# Patient Record
Sex: MALE | Race: AMERICAN INDIAN OR ALASKA NATIVE | NOT HISPANIC OR LATINO | URBAN - METROPOLITAN AREA
[De-identification: names, ages, dates, MRNs, and addresses within clinical notes are randomized per-mention and may not be internally consistent; named-entity substitution may affect disease eponyms.]

---

## 2018-02-02 ENCOUNTER — INPATIENT (INPATIENT)
Facility: HOSPITAL | Age: 63
LOS: 0 days | Discharge: HOME | End: 2018-02-03
Attending: INTERNAL MEDICINE | Admitting: INTERNAL MEDICINE
Payer: MEDICAID

## 2018-02-02 VITALS
DIASTOLIC BLOOD PRESSURE: 87 MMHG | RESPIRATION RATE: 20 BRPM | HEART RATE: 80 BPM | SYSTOLIC BLOOD PRESSURE: 154 MMHG | OXYGEN SATURATION: 100 % | TEMPERATURE: 98 F

## 2018-02-02 DIAGNOSIS — I24.9 ACUTE ISCHEMIC HEART DISEASE, UNSPECIFIED: ICD-10-CM

## 2018-02-02 DIAGNOSIS — Z98.49 CATARACT EXTRACTION STATUS, UNSPECIFIED EYE: Chronic | ICD-10-CM

## 2018-02-02 DIAGNOSIS — I10 ESSENTIAL (PRIMARY) HYPERTENSION: ICD-10-CM

## 2018-02-02 LAB
ALBUMIN SERPL ELPH-MCNC: 4 G/DL — SIGNIFICANT CHANGE UP (ref 3.3–5)
ALP SERPL-CCNC: 81 U/L — SIGNIFICANT CHANGE UP (ref 40–120)
ALT FLD-CCNC: 15 U/L — SIGNIFICANT CHANGE UP (ref 4–41)
APTT BLD: 34.5 SEC — SIGNIFICANT CHANGE UP (ref 27.5–37.4)
AST SERPL-CCNC: 18 U/L — SIGNIFICANT CHANGE UP (ref 4–40)
BASOPHILS # BLD AUTO: 0.06 K/UL — SIGNIFICANT CHANGE UP (ref 0–0.2)
BASOPHILS NFR BLD AUTO: 0.9 % — SIGNIFICANT CHANGE UP (ref 0–2)
BILIRUB SERPL-MCNC: 0.9 MG/DL — SIGNIFICANT CHANGE UP (ref 0.2–1.2)
BUN SERPL-MCNC: 11 MG/DL — SIGNIFICANT CHANGE UP (ref 7–23)
CALCIUM SERPL-MCNC: 9.1 MG/DL — SIGNIFICANT CHANGE UP (ref 8.4–10.5)
CHLORIDE SERPL-SCNC: 103 MMOL/L — SIGNIFICANT CHANGE UP (ref 98–107)
CK MB BLD-MCNC: 2.08 NG/ML — SIGNIFICANT CHANGE UP (ref 1–6.6)
CK MB BLD-MCNC: 2.16 NG/ML — SIGNIFICANT CHANGE UP (ref 1–6.6)
CK SERPL-CCNC: 106 U/L — SIGNIFICANT CHANGE UP (ref 30–200)
CK SERPL-CCNC: 112 U/L — SIGNIFICANT CHANGE UP (ref 30–200)
CO2 SERPL-SCNC: 24 MMOL/L — SIGNIFICANT CHANGE UP (ref 22–31)
CREAT SERPL-MCNC: 1.01 MG/DL — SIGNIFICANT CHANGE UP (ref 0.5–1.3)
D DIMER BLD IA.RAPID-MCNC: < 150 NG/ML — SIGNIFICANT CHANGE UP
EOSINOPHIL # BLD AUTO: 0.41 K/UL — SIGNIFICANT CHANGE UP (ref 0–0.5)
EOSINOPHIL NFR BLD AUTO: 5.9 % — SIGNIFICANT CHANGE UP (ref 0–6)
GLUCOSE SERPL-MCNC: 127 MG/DL — HIGH (ref 70–99)
HCT VFR BLD CALC: 45.4 % — SIGNIFICANT CHANGE UP (ref 39–50)
HGB BLD-MCNC: 15.3 G/DL — SIGNIFICANT CHANGE UP (ref 13–17)
IMM GRANULOCYTES # BLD AUTO: 0.01 # — SIGNIFICANT CHANGE UP
IMM GRANULOCYTES NFR BLD AUTO: 0.1 % — SIGNIFICANT CHANGE UP (ref 0–1.5)
INR BLD: 1.19 — HIGH (ref 0.88–1.17)
LYMPHOCYTES # BLD AUTO: 1.95 K/UL — SIGNIFICANT CHANGE UP (ref 1–3.3)
LYMPHOCYTES # BLD AUTO: 28.1 % — SIGNIFICANT CHANGE UP (ref 13–44)
MCHC RBC-ENTMCNC: 28.9 PG — SIGNIFICANT CHANGE UP (ref 27–34)
MCHC RBC-ENTMCNC: 33.7 % — SIGNIFICANT CHANGE UP (ref 32–36)
MCV RBC AUTO: 85.7 FL — SIGNIFICANT CHANGE UP (ref 80–100)
MONOCYTES # BLD AUTO: 0.5 K/UL — SIGNIFICANT CHANGE UP (ref 0–0.9)
MONOCYTES NFR BLD AUTO: 7.2 % — SIGNIFICANT CHANGE UP (ref 2–14)
NEUTROPHILS # BLD AUTO: 4.02 K/UL — SIGNIFICANT CHANGE UP (ref 1.8–7.4)
NEUTROPHILS NFR BLD AUTO: 57.8 % — SIGNIFICANT CHANGE UP (ref 43–77)
NRBC # FLD: 0 — SIGNIFICANT CHANGE UP
PLATELET # BLD AUTO: 219 K/UL — SIGNIFICANT CHANGE UP (ref 150–400)
PMV BLD: 9.7 FL — SIGNIFICANT CHANGE UP (ref 7–13)
POTASSIUM SERPL-MCNC: 4.1 MMOL/L — SIGNIFICANT CHANGE UP (ref 3.5–5.3)
POTASSIUM SERPL-SCNC: 4.1 MMOL/L — SIGNIFICANT CHANGE UP (ref 3.5–5.3)
PROT SERPL-MCNC: 7.6 G/DL — SIGNIFICANT CHANGE UP (ref 6–8.3)
PROTHROM AB SERPL-ACNC: 13.3 SEC — HIGH (ref 9.8–13.1)
RBC # BLD: 5.3 M/UL — SIGNIFICANT CHANGE UP (ref 4.2–5.8)
RBC # FLD: 12.7 % — SIGNIFICANT CHANGE UP (ref 10.3–14.5)
SODIUM SERPL-SCNC: 140 MMOL/L — SIGNIFICANT CHANGE UP (ref 135–145)
TROPONIN T SERPL-MCNC: < 0.06 NG/ML — SIGNIFICANT CHANGE UP (ref 0–0.06)
TROPONIN T SERPL-MCNC: < 0.06 NG/ML — SIGNIFICANT CHANGE UP (ref 0–0.06)
TSH SERPL-MCNC: 2.18 UIU/ML — SIGNIFICANT CHANGE UP (ref 0.27–4.2)
WBC # BLD: 6.95 K/UL — SIGNIFICANT CHANGE UP (ref 3.8–10.5)
WBC # FLD AUTO: 6.95 K/UL — SIGNIFICANT CHANGE UP (ref 3.8–10.5)

## 2018-02-02 PROCEDURE — 71046 X-RAY EXAM CHEST 2 VIEWS: CPT | Mod: 26

## 2018-02-02 RX ORDER — ASPIRIN/CALCIUM CARB/MAGNESIUM 324 MG
162 TABLET ORAL ONCE
Qty: 0 | Refills: 0 | Status: COMPLETED | OUTPATIENT
Start: 2018-02-02 | End: 2018-02-02

## 2018-02-02 RX ORDER — HYDROCHLOROTHIAZIDE 25 MG
12.5 TABLET ORAL DAILY
Qty: 0 | Refills: 0 | Status: DISCONTINUED | OUTPATIENT
Start: 2018-02-02 | End: 2018-02-03

## 2018-02-02 RX ORDER — IBUPROFEN 200 MG
600 TABLET ORAL EVERY 8 HOURS
Qty: 0 | Refills: 0 | Status: DISCONTINUED | OUTPATIENT
Start: 2018-02-02 | End: 2018-02-03

## 2018-02-02 RX ORDER — SODIUM CHLORIDE 9 MG/ML
1000 INJECTION INTRAMUSCULAR; INTRAVENOUS; SUBCUTANEOUS ONCE
Qty: 0 | Refills: 0 | Status: COMPLETED | OUTPATIENT
Start: 2018-02-02 | End: 2018-02-02

## 2018-02-02 RX ORDER — LOSARTAN/HYDROCHLOROTHIAZIDE 100MG-25MG
1 TABLET ORAL
Qty: 0 | Refills: 0 | COMMUNITY

## 2018-02-02 RX ORDER — ASPIRIN/CALCIUM CARB/MAGNESIUM 324 MG
81 TABLET ORAL DAILY
Qty: 0 | Refills: 0 | Status: DISCONTINUED | OUTPATIENT
Start: 2018-02-03 | End: 2018-02-03

## 2018-02-02 RX ORDER — LOSARTAN POTASSIUM 100 MG/1
50 TABLET, FILM COATED ORAL DAILY
Qty: 0 | Refills: 0 | Status: DISCONTINUED | OUTPATIENT
Start: 2018-02-02 | End: 2018-02-03

## 2018-02-02 RX ORDER — INFLUENZA VIRUS VACCINE 15; 15; 15; 15 UG/.5ML; UG/.5ML; UG/.5ML; UG/.5ML
0.5 SUSPENSION INTRAMUSCULAR ONCE
Qty: 0 | Refills: 0 | Status: DISCONTINUED | OUTPATIENT
Start: 2018-02-02 | End: 2018-02-03

## 2018-02-02 RX ORDER — ENOXAPARIN SODIUM 100 MG/ML
40 INJECTION SUBCUTANEOUS EVERY 24 HOURS
Qty: 0 | Refills: 0 | Status: DISCONTINUED | OUTPATIENT
Start: 2018-02-02 | End: 2018-02-03

## 2018-02-02 RX ADMIN — Medication 12.5 MILLIGRAM(S): at 21:47

## 2018-02-02 RX ADMIN — SODIUM CHLORIDE 500 MILLILITER(S): 9 INJECTION INTRAMUSCULAR; INTRAVENOUS; SUBCUTANEOUS at 12:51

## 2018-02-02 RX ADMIN — Medication 162 MILLIGRAM(S): at 12:04

## 2018-02-02 RX ADMIN — ENOXAPARIN SODIUM 40 MILLIGRAM(S): 100 INJECTION SUBCUTANEOUS at 21:47

## 2018-02-02 RX ADMIN — LOSARTAN POTASSIUM 50 MILLIGRAM(S): 100 TABLET, FILM COATED ORAL at 21:47

## 2018-02-02 NOTE — H&P ADULT - NEGATIVE NEUROLOGICAL SYMPTOMS
no generalized seizures/no headache/no syncope/no loss of consciousness/no focal seizures/no vertigo

## 2018-02-02 NOTE — ED PROVIDER NOTE - ATTENDING CONTRIBUTION TO CARE
62M h/o HTN presents from home with family for chest pressure to left side. Started yesterday, gradual, nonradiating, no aggravating/alleviating factors. Some SOB, cough, nasal congestion.    Patient denies headache, vision changes, focal weakness/numbness, neck pain, fever, back pain, abd pain, nausea, vomiting, diarrhea, constipation, blood in stool, dysuria, rash, trauma, falls. Patient is well appearing, conversant, cooperative, smiling, head atraumatic, neck supple with full range of motion, oropharynx clear, lungs clear, no crackles or rales, speaking full sentences, heart clear, no murmurs, distal pulses equal in all 4 extremities, abdomen soft nontender nondistended with no masses, no leg edema, no calf tenderness, nonfocal neurologic exam.    The patient's risk factors for ACS were reviewed as well as the EKG.  The CXR assists in r/o Pneumonia, Pneumothorax, Esophageal Tears.  The patient does not appear to have a Pulmonary Embolism based on the Wells Score and there is no apparent DVT.  There are no signs of Pericarditis, Endocarditis, or Myocarditis based on risk factor analysis.  There is no fever.  There does not appear to be an Aortic Dissection either based on history, physical exam, and signs. Serial ekg, troponin, cardiac monitor, aspirin, reassess.

## 2018-02-02 NOTE — ED PROVIDER NOTE - PROGRESS NOTE DETAILS
trop appreciated. will admit for further workup. discussed with dr. hernandez - will admit to his service for further workup

## 2018-02-02 NOTE — H&P ADULT - NEGATIVE ENMT SYMPTOMS
no nasal congestion/no ear pain/no vertigo/no sinus symptoms/no throat pain/no hearing difficulty/no tinnitus/no nasal discharge/no dysphagia

## 2018-02-02 NOTE — ED PROVIDER NOTE - OBJECTIVE STATEMENT
61 yo male w/ pmhx of HTN c/o chest pain. per the patient he has been having chest pain since yesterday. pain is pressure or sharp at times. Not associated w/ position or exertion but persistently is there. Has been having cough, nonproductive over the past 3-4 days as well and states that people at home have flu like symptoms. Denies any n/v/d, abdominal pain, new back pain, or dizziness. Denies any sob but does state that when he takes a deep breath the pain gets a little worse. No recent falls or trauma to that side. No hx of stress test or cardiac workup but does that his physician has told him "everything is ok." Does not take a baby asa daily.

## 2018-02-02 NOTE — H&P ADULT - HISTORY OF PRESENT ILLNESS
61 yo M p/w constant left sided CP described as "hurting", 4/10 severity, non-radiating since mid-day yesterday. Pt ignored CP initially but later in the day noticed it worsened to 7/10 severity saw with inhalation. Pt did not notice any exertional, reproducible, or positional component to his CP. Denies experiencing this type of CP in the past.  Pt did feel 1 episode of SOB while washing his face briefly. No other SOB, CONCEPCION, or LE edema. Pt works as an [employer] , denies lifting anything too heavy yesterday.  Pt did have a dry cough that lasted 2-3 days about 2 weeks ago but that has resolved.

## 2018-02-02 NOTE — ED PROVIDER NOTE - MEDICAL DECISION MAKING DETAILS
61 yo male w/ pmhx of HTN c/o chest pain, left sided. Given history and presentation consider PNA vs bronchitis vs ACS. Heart score 2-3. Will send labs, cxr. 61 yo male w/ pmhx of HTN c/o chest pain, left sided. Given history and presentation consider PNA vs bronchitis vs ACS. Heart score 2-3. Will send labs, cxr.  EKG NSR at 77

## 2018-02-02 NOTE — H&P ADULT - PROBLEM SELECTOR PLAN 1
Admit to Telemetry, serial CE's, EKG's, FLP, Ddimer, start ASA, F/U Cardiology note Admit to Telemetry, serial CE's, EKG's, FLP, Ddimer, Echo, start ASA, F/U Cardiology note

## 2018-02-02 NOTE — ED ADULT NURSE NOTE - OBJECTIVE STATEMENT
Pt A&Ox4, primarily Christianity speaking with daughter at bedside for translation if needed. c/o left-sided intermittent cp starting yesterday. Denies any dizziness, sob, n/v/d. pmhx HTN. #20g IV placed in right AC, labs done. MD at bedside for eval. VS done as charted, breathing well on RA. NSR on cardiac monitor. Will continue to monitor.

## 2018-02-02 NOTE — H&P ADULT - ASSESSMENT
61 yo M p/w left sided CP since yesterday 63 yo M p/w left sided CP since yesterday  EKG- NSR @ 77 TWI avL

## 2018-02-03 VITALS
TEMPERATURE: 98 F | RESPIRATION RATE: 18 BRPM | SYSTOLIC BLOOD PRESSURE: 109 MMHG | HEART RATE: 68 BPM | DIASTOLIC BLOOD PRESSURE: 78 MMHG | OXYGEN SATURATION: 97 %

## 2018-02-03 LAB
ALBUMIN SERPL ELPH-MCNC: 4 G/DL — SIGNIFICANT CHANGE UP (ref 3.3–5)
ALP SERPL-CCNC: 77 U/L — SIGNIFICANT CHANGE UP (ref 40–120)
ALT FLD-CCNC: 17 U/L — SIGNIFICANT CHANGE UP (ref 4–41)
AST SERPL-CCNC: 17 U/L — SIGNIFICANT CHANGE UP (ref 4–40)
BASOPHILS # BLD AUTO: 0.07 K/UL — SIGNIFICANT CHANGE UP (ref 0–0.2)
BASOPHILS NFR BLD AUTO: 0.9 % — SIGNIFICANT CHANGE UP (ref 0–2)
BILIRUB SERPL-MCNC: 1 MG/DL — SIGNIFICANT CHANGE UP (ref 0.2–1.2)
BUN SERPL-MCNC: 11 MG/DL — SIGNIFICANT CHANGE UP (ref 7–23)
CALCIUM SERPL-MCNC: 9.2 MG/DL — SIGNIFICANT CHANGE UP (ref 8.4–10.5)
CHLORIDE SERPL-SCNC: 101 MMOL/L — SIGNIFICANT CHANGE UP (ref 98–107)
CHOLEST SERPL-MCNC: 170 MG/DL — SIGNIFICANT CHANGE UP (ref 120–199)
CO2 SERPL-SCNC: 24 MMOL/L — SIGNIFICANT CHANGE UP (ref 22–31)
CREAT SERPL-MCNC: 1.03 MG/DL — SIGNIFICANT CHANGE UP (ref 0.5–1.3)
EOSINOPHIL # BLD AUTO: 0.45 K/UL — SIGNIFICANT CHANGE UP (ref 0–0.5)
EOSINOPHIL NFR BLD AUTO: 6 % — SIGNIFICANT CHANGE UP (ref 0–6)
GLUCOSE SERPL-MCNC: 87 MG/DL — SIGNIFICANT CHANGE UP (ref 70–99)
HBA1C BLD-MCNC: 5.6 % — SIGNIFICANT CHANGE UP (ref 4–5.6)
HCT VFR BLD CALC: 46.2 % — SIGNIFICANT CHANGE UP (ref 39–50)
HDLC SERPL-MCNC: 31 MG/DL — LOW (ref 35–55)
HGB BLD-MCNC: 16 G/DL — SIGNIFICANT CHANGE UP (ref 13–17)
IMM GRANULOCYTES # BLD AUTO: 0.01 # — SIGNIFICANT CHANGE UP
IMM GRANULOCYTES NFR BLD AUTO: 0.1 % — SIGNIFICANT CHANGE UP (ref 0–1.5)
LIPID PNL WITH DIRECT LDL SERPL: 122 MG/DL — SIGNIFICANT CHANGE UP
LYMPHOCYTES # BLD AUTO: 2.51 K/UL — SIGNIFICANT CHANGE UP (ref 1–3.3)
LYMPHOCYTES # BLD AUTO: 33.4 % — SIGNIFICANT CHANGE UP (ref 13–44)
MAGNESIUM SERPL-MCNC: 2 MG/DL — SIGNIFICANT CHANGE UP (ref 1.6–2.6)
MCHC RBC-ENTMCNC: 30.1 PG — SIGNIFICANT CHANGE UP (ref 27–34)
MCHC RBC-ENTMCNC: 34.6 % — SIGNIFICANT CHANGE UP (ref 32–36)
MCV RBC AUTO: 86.8 FL — SIGNIFICANT CHANGE UP (ref 80–100)
MONOCYTES # BLD AUTO: 0.66 K/UL — SIGNIFICANT CHANGE UP (ref 0–0.9)
MONOCYTES NFR BLD AUTO: 8.8 % — SIGNIFICANT CHANGE UP (ref 2–14)
NEUTROPHILS # BLD AUTO: 3.82 K/UL — SIGNIFICANT CHANGE UP (ref 1.8–7.4)
NEUTROPHILS NFR BLD AUTO: 50.8 % — SIGNIFICANT CHANGE UP (ref 43–77)
NRBC # FLD: 0 — SIGNIFICANT CHANGE UP
PHOSPHATE SERPL-MCNC: 3.6 MG/DL — SIGNIFICANT CHANGE UP (ref 2.5–4.5)
PLATELET # BLD AUTO: 195 K/UL — SIGNIFICANT CHANGE UP (ref 150–400)
PMV BLD: 10.1 FL — SIGNIFICANT CHANGE UP (ref 7–13)
POTASSIUM SERPL-MCNC: 4.1 MMOL/L — SIGNIFICANT CHANGE UP (ref 3.5–5.3)
POTASSIUM SERPL-SCNC: 4.1 MMOL/L — SIGNIFICANT CHANGE UP (ref 3.5–5.3)
PROT SERPL-MCNC: 7.3 G/DL — SIGNIFICANT CHANGE UP (ref 6–8.3)
RBC # BLD: 5.32 M/UL — SIGNIFICANT CHANGE UP (ref 4.2–5.8)
RBC # FLD: 12.8 % — SIGNIFICANT CHANGE UP (ref 10.3–14.5)
SODIUM SERPL-SCNC: 139 MMOL/L — SIGNIFICANT CHANGE UP (ref 135–145)
TRIGL SERPL-MCNC: 124 MG/DL — SIGNIFICANT CHANGE UP (ref 10–149)
WBC # BLD: 7.52 K/UL — SIGNIFICANT CHANGE UP (ref 3.8–10.5)
WBC # FLD AUTO: 7.52 K/UL — SIGNIFICANT CHANGE UP (ref 3.8–10.5)

## 2018-02-03 PROCEDURE — 93306 TTE W/DOPPLER COMPLETE: CPT | Mod: 26

## 2018-02-03 RX ORDER — ASPIRIN/CALCIUM CARB/MAGNESIUM 324 MG
1 TABLET ORAL
Qty: 0 | Refills: 0 | COMMUNITY
Start: 2018-02-03

## 2018-02-03 RX ADMIN — Medication 200 MILLIGRAM(S): at 06:15

## 2018-02-03 RX ADMIN — Medication 81 MILLIGRAM(S): at 09:15

## 2018-02-03 RX ADMIN — Medication 12.5 MILLIGRAM(S): at 05:17

## 2018-02-03 RX ADMIN — LOSARTAN POTASSIUM 50 MILLIGRAM(S): 100 TABLET, FILM COATED ORAL at 05:17

## 2018-02-03 NOTE — DISCHARGE NOTE ADULT - PATIENT PORTAL LINK FT
You can access the [facility] Patient Portal, offered by [facility], by registering with the following website: [email or web address]

## 2018-02-03 NOTE — DISCHARGE NOTE ADULT - CARE PLAN
Principal Discharge DX:	Chest pain, unspecified type  Goal:	resolved  Assessment and plan of treatment:	follow up with Joe Forst in 1 week for outpatient stress test  Secondary Diagnosis:	Essential hypertension  Assessment and plan of treatment:	low salt diet  continue current meds

## 2018-02-03 NOTE — DISCHARGE NOTE ADULT - OTHER SIGNIFICANT FINDINGS
EKG- NSR @ 77 TWI avL  CXR normal chest  CE neg x2  Ddimer <150  2/3 Echo: Conclusions:  1. Normal mitral valve. Minimal mitral regurgitation.  2. Aortic valve not well visualized; appears to be a  calcified trileaflet valve with normal opening. No aortic  valve regurgitation seen.  3. Endocardium not well visualized; hyperdynamic left  ventricle. No segmental wall motion abnormalities.  4. Normal right ventricular size and function.  *** No previous Echo exam.

## 2018-02-03 NOTE — DISCHARGE NOTE ADULT - CARE PROVIDER_API CALL
Joe Sanford), Cardiovascular Disease; Internal Medicine; Nuclear Cardiology  [address] [place] [address]  Phone: [phone]  Fax: [phone]

## 2018-02-03 NOTE — DISCHARGE NOTE ADULT - MEDICATION SUMMARY - MEDICATIONS TO TAKE
I will START or STAY ON the medications listed below when I get home from the hospital:    aspirin 81 mg oral delayed release tablet  -- 1 tab(s) by mouth once a day  -- Indication: For preventative    losartan-hydrochlorothiazide 50mg-12.5mg oral tablet  -- 1 tab(s) by mouth once a day  -- Indication: For Essential hypertension

## 2018-02-03 NOTE — DISCHARGE NOTE ADULT - PLAN OF CARE
resolved follow up with Joe Frost in 1 week for outpatient stress test low salt diet  continue current meds

## 2018-02-03 NOTE — DISCHARGE NOTE ADULT - HOSPITAL COURSE
61 yo M p/w left sided CP since yesterday  EKG- NSR @ 77 TWI avL     Problem/Plan - 1:  ·  Problem: ACS (acute coronary syndrome).  Plan: Admit to Telemetry, serial CE's, EKG's, FLP, Ddimer, Echo, start ASA, F/U Cardiology note.      Problem/Plan - 2:  ·  Problem: Essential hypertension.  Plan: continue losartan, hctz.        Attending Statement:  EKG - NSR   Echo - hyperdynamic LV    A/P   1) Chest pain - atypical , Echo shows normal LV function, out pt stress test     2) HTN - losartan

## 2020-04-14 NOTE — H&P ADULT - ATTENDING COMMENTS
low sodium
EKG - NSR   Echo - hyperdynamic LV    A/P   1) Chest pain - atypical , Echo shows normal LV function, out pt stress test     2) HTN - losartan     3) DVT prophylasix - sc lovenox

## 2021-11-10 NOTE — ED ADULT NURSE NOTE - PRIMARY CARE PROVIDER
1. Call [phone] to schedule the MRI with anesthesia. He must have a pre-op physical 1 week prior to the MRI and then a covid test before the MRI. 2. Call [facility] to schedule a new patient appointment. [address] [employer] [facility], 3rd floor, [address] for blood work today. 4. Follow up in 3 months. unknown

## 2022-06-15 ENCOUNTER — EMERGENCY (EMERGENCY)
Facility: HOSPITAL | Age: 67
LOS: 1 days | Discharge: HOME | End: 2022-06-15
Attending: STUDENT IN AN ORGANIZED HEALTH CARE EDUCATION/TRAINING PROGRAM
Payer: SELF-PAY

## 2022-06-15 VITALS
HEIGHT: 67 IN | RESPIRATION RATE: 18 BRPM | DIASTOLIC BLOOD PRESSURE: 81 MMHG | HEART RATE: 66 BPM | SYSTOLIC BLOOD PRESSURE: 134 MMHG | WEIGHT: 244.93 LBS | TEMPERATURE: 99 F | OXYGEN SATURATION: 99 %

## 2022-06-15 DIAGNOSIS — Z98.49 CATARACT EXTRACTION STATUS, UNSPECIFIED EYE: Chronic | ICD-10-CM

## 2022-06-15 LAB
ALBUMIN SERPL ELPH-MCNC: 4.3 G/DL — SIGNIFICANT CHANGE UP (ref 3.3–5)
ALP SERPL-CCNC: 87 U/L — SIGNIFICANT CHANGE UP (ref 40–120)
ALT FLD-CCNC: 23 U/L — SIGNIFICANT CHANGE UP (ref 10–45)
ANION GAP SERPL CALC-SCNC: 10 MMOL/L — SIGNIFICANT CHANGE UP (ref 5–17)
AST SERPL-CCNC: 23 U/L — SIGNIFICANT CHANGE UP (ref 10–40)
BASOPHILS # BLD AUTO: 0.11 K/UL — SIGNIFICANT CHANGE UP (ref 0–0.2)
BASOPHILS NFR BLD AUTO: 0.8 % — SIGNIFICANT CHANGE UP (ref 0–2)
BILIRUB SERPL-MCNC: 0.9 MG/DL — SIGNIFICANT CHANGE UP (ref 0.2–1.2)
BUN SERPL-MCNC: 11 MG/DL — SIGNIFICANT CHANGE UP (ref 7–23)
CALCIUM SERPL-MCNC: 9.4 MG/DL — SIGNIFICANT CHANGE UP (ref 8.4–10.5)
CHLORIDE SERPL-SCNC: 94 MMOL/L — LOW (ref 96–108)
CO2 SERPL-SCNC: 24 MMOL/L — SIGNIFICANT CHANGE UP (ref 22–31)
CREAT SERPL-MCNC: 1.03 MG/DL — SIGNIFICANT CHANGE UP (ref 0.5–1.3)
EGFR: 81 ML/MIN/1.73M2 — SIGNIFICANT CHANGE UP
EOSINOPHIL # BLD AUTO: 0.22 K/UL — SIGNIFICANT CHANGE UP (ref 0–0.5)
EOSINOPHIL NFR BLD AUTO: 1.7 % — SIGNIFICANT CHANGE UP (ref 0–6)
GLUCOSE SERPL-MCNC: 102 MG/DL — HIGH (ref 70–99)
HCT VFR BLD CALC: 42.4 % — SIGNIFICANT CHANGE UP (ref 39–50)
HGB BLD-MCNC: 14.1 G/DL — SIGNIFICANT CHANGE UP (ref 13–17)
IMM GRANULOCYTES NFR BLD AUTO: 0.5 % — SIGNIFICANT CHANGE UP (ref 0–1.5)
LYMPHOCYTES # BLD AUTO: 1.64 K/UL — SIGNIFICANT CHANGE UP (ref 1–3.3)
LYMPHOCYTES # BLD AUTO: 12.6 % — LOW (ref 13–44)
MCHC RBC-ENTMCNC: 29 PG — SIGNIFICANT CHANGE UP (ref 27–34)
MCHC RBC-ENTMCNC: 33.3 GM/DL — SIGNIFICANT CHANGE UP (ref 32–36)
MCV RBC AUTO: 87.2 FL — SIGNIFICANT CHANGE UP (ref 80–100)
MONOCYTES # BLD AUTO: 1.03 K/UL — HIGH (ref 0–0.9)
MONOCYTES NFR BLD AUTO: 7.9 % — SIGNIFICANT CHANGE UP (ref 2–14)
NEUTROPHILS # BLD AUTO: 9.95 K/UL — HIGH (ref 1.8–7.4)
NEUTROPHILS NFR BLD AUTO: 76.5 % — SIGNIFICANT CHANGE UP (ref 43–77)
NRBC # BLD: 0 /100 WBCS — SIGNIFICANT CHANGE UP (ref 0–0)
PLATELET # BLD AUTO: 193 K/UL — SIGNIFICANT CHANGE UP (ref 150–400)
POTASSIUM SERPL-MCNC: 4.1 MMOL/L — SIGNIFICANT CHANGE UP (ref 3.5–5.3)
POTASSIUM SERPL-SCNC: 4.1 MMOL/L — SIGNIFICANT CHANGE UP (ref 3.5–5.3)
PROT SERPL-MCNC: 7.6 G/DL — SIGNIFICANT CHANGE UP (ref 6–8.3)
RBC # BLD: 4.86 M/UL — SIGNIFICANT CHANGE UP (ref 4.2–5.8)
RBC # FLD: 12.6 % — SIGNIFICANT CHANGE UP (ref 10.3–14.5)
SODIUM SERPL-SCNC: 128 MMOL/L — LOW (ref 135–145)
TROPONIN T, HIGH SENSITIVITY RESULT: 15 NG/L — SIGNIFICANT CHANGE UP (ref 0–51)
WBC # BLD: 13.01 K/UL — HIGH (ref 3.8–10.5)
WBC # FLD AUTO: 13.01 K/UL — HIGH (ref 3.8–10.5)

## 2022-06-15 PROCEDURE — 71045 X-RAY EXAM CHEST 1 VIEW: CPT

## 2022-06-15 PROCEDURE — 71045 X-RAY EXAM CHEST 1 VIEW: CPT | Mod: 26

## 2022-06-15 PROCEDURE — 96374 THER/PROPH/DIAG INJ IV PUSH: CPT

## 2022-06-15 PROCEDURE — 85025 COMPLETE CBC W/AUTO DIFF WBC: CPT

## 2022-06-15 PROCEDURE — 80053 COMPREHEN METABOLIC PANEL: CPT

## 2022-06-15 PROCEDURE — 84484 ASSAY OF TROPONIN QUANT: CPT

## 2022-06-15 PROCEDURE — 99285 EMERGENCY DEPT VISIT HI MDM: CPT | Mod: 25

## 2022-06-15 PROCEDURE — 99285 EMERGENCY DEPT VISIT HI MDM: CPT

## 2022-06-15 RX ORDER — SODIUM CHLORIDE 9 MG/ML
1000 INJECTION INTRAMUSCULAR; INTRAVENOUS; SUBCUTANEOUS ONCE
Refills: 0 | Status: COMPLETED | OUTPATIENT
Start: 2022-06-15 | End: 2022-06-15

## 2022-06-15 RX ORDER — KETOROLAC TROMETHAMINE 30 MG/ML
15 SYRINGE (ML) INJECTION ONCE
Refills: 0 | Status: DISCONTINUED | OUTPATIENT
Start: 2022-06-15 | End: 2022-06-15

## 2022-06-15 RX ADMIN — SODIUM CHLORIDE 1000 MILLILITER(S): 9 INJECTION INTRAMUSCULAR; INTRAVENOUS; SUBCUTANEOUS at 23:51

## 2022-06-15 RX ADMIN — Medication 15 MILLIGRAM(S): at 23:51

## 2022-06-15 NOTE — ED PROVIDER NOTE - NSFOLLOWUPINSTRUCTIONS_ED_ALL_ED_FT
Discharge instructions:    - Please follow up with your Primary Care Doctor within the next 24-72 hours.    - We recommend you follow up with a Spine Doctor; attached is clinic information.     - Tylenol up to 650 mg every 4-6 hours as needed for pain and/or Motrin up to 600 mg every 6 hours as needed for pain. Take any prescribed medications as instructed:       SEEK IMMEDIATE MEDICAL CARE IF YOU HAVE ANY OF THE FOLLOWING SYMPTOMS: bowel or bladder control problems, unusual weakness or numbness in your arms or legs, nausea or vomiting, abdominal pain, fever, dizziness/lightheadedness. Discharge instructions:    - Please follow up with your Primary Care Doctor within the next 24-72 hours.    - We recommend you follow up with a Spine Doctor; our discharge lounge will call you and assist you in making an appointment.     - Tylenol up to 650 mg every 4-6 hours as needed for pain and/or Motrin up to 600 mg every 6 hours as needed for pain. Take any prescribed medications as instructed:       SEEK IMMEDIATE MEDICAL CARE IF YOU HAVE ANY OF THE FOLLOWING SYMPTOMS: bowel or bladder control problems, unusual weakness or numbness in your arms or legs, nausea or vomiting, abdominal pain, fever, dizziness/lightheadedness.

## 2022-06-15 NOTE — ED ADULT NURSE NOTE - OBJECTIVE STATEMENT
66 y/o male coming to the ER with c/o back pain. A&Ox4. Ambulatory. PMH HTN. Patient endorses 2 weeks of left sided back pain. Patient endorses the pain is constant. Patient states it may be from lifting suitcases at work, he is an [employer]er . Pa 68 y/o male coming to the ER with c/o back pain. A&Ox4. Ambulatory. PMH HTN. Patient endorses 2 weeks of left sided back pain. Patient endorses the pain is constant. Patient states it may be from lifting suitcases at work, he is an [employer]er . Patient was gardening today when he started to have "severe" back pain. He sat down, and his daughter endorses LOC for about 6 seconds and states patient was diaphoretic. Patient endorses the pain does not radiate and stays in the left middle back. Patient states he took tylenol at home around 7pm with minimal relief. Equal sensation in all extremities. Patient ambulates with difficulty due to pain. Patient denies chest pain, fever, chills, n/v/d, urinary symptoms, abdominal pain. Safety measures maintained. Bed in the lowest position.  Provider at the bedside. No acute distress noted or further complaints at this time.

## 2022-06-15 NOTE — ED ADULT NURSE NOTE - NSIMPLEMENTINTERV_GEN_ALL_ED
Implemented All Universal Safety Interventions:  [place] to call system. Call bell, personal items and telephone within reach. Instruct patient to call for assistance. Room bathroom lighting operational. Non-slip footwear when patient is off stretcher. Physically safe environment: no spills, clutter or unnecessary equipment. Stretcher in lowest position, wheels locked, appropriate side rails in place.

## 2022-06-15 NOTE — ED PROVIDER NOTE - PROGRESS NOTE DETAILS
Jorge, PGY2: Patient reassessed and noting symptomatic improvement of pain. Able to ambulate without difficulty. Requesting to leave. Strict return precautions provided and will provide patient with spine follow up. Patient understands and agrees w/ plan.

## 2022-06-15 NOTE — ED PROVIDER NOTE - PHYSICAL EXAMINATION
GENERAL: well appearing in no acute distress, non-toxic appearing  HEAD: normocephalic, atraumatic  HENT: airway intact, neck supple, oral mucosa dry  EYES:  normal conjunctiva  CARDIAC: regular rate and rhythm, normal S1S2, no appreciable murmurs, 2+ pulses in UE/LE b/l  PULM: normal breath sounds, clear to ascultation bilaterally, no rales, rhonchi, wheezing  GI: abdomen nondistended, soft, nontender, no guarding, rebound tenderness  NEURO: no focal motor or sensory deficits, CN2-12 intact, normal speech, normal gait, AAOx3  MSK: NO midline TTP; 5/5 strength in b/l lower extremity; mild TTP over L2 paraspinal   SKIN: well-perfused, extremities warm, no visible rashes

## 2022-06-15 NOTE — ED PROVIDER NOTE - PATIENT PORTAL LINK FT
You can access the FollowMyHealth Patient Portal offered by [facility] by registering at the following website: [email or web address]. By joining [employer]’s FollowMyHealth portal, you will also be able to view your health information using other applications (apps) compatible with our system.

## 2022-06-15 NOTE — ED PROVIDER NOTE - CLINICAL SUMMARY MEDICAL DECISION MAKING FREE TEXT BOX
66yo M w/ history of HTN coming in w/ back pain and an episode of syncope while gardening; no concern for ruptured AAA given lack of comorbidities and stable vitals w/ no pulsatile mass. Low concern for cauda equina or cord compression given lack of neurologic deficits. Symptoms likely secondary to dehydration + pass w/ +/- vasovagal syncope. Will eval labs + fluids + pain control and reassess

## 2022-06-15 NOTE — ED PROVIDER NOTE - OBJECTIVE STATEMENT
66yo M w/ history of HTN coming in w/ back pain and an episode of syncope while gardening. Patient has had intermittent left sided back pain for ~1 month. 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 Patient was gardening and felt extreme pain from the left side of his pain, went to sit and felt very hot and felt lightheaded. Witnessed by daughter who states he was unconscious for about 5 seconds but when he awoke, was feeling mildly nauseous but alert and oriented. Symptoms were transient and have since self resolved. States back pain is position and not associated w/ leg weakness, difficulty walking, or urinary incontinence.

## 2022-06-15 NOTE — ED PROVIDER NOTE - NS ED ROS FT
General: denies fever, chills  HENT: denies nasal congestion, rhinorrhea  Eyes: denies visual changes, blurred vision  CV: denies chest pain, palpitations  Resp: denies difficulty breathing, cough  Abdominal: denies nausea, vomiting, diarrhea, abdominal pain  : denies urinary pain or discharge  MSK: left sided back pain  Neuro: denies headaches, numbness, tingling  Skin: denies rashes, bruises

## 2022-06-15 NOTE — ED PROVIDER NOTE - RAPID ASSESSMENT
67 M w/ PMHx of HTN c/o back pain x 2 weeks worsened today and an episode of syncope followed by SOB while gardening today. Reports chills. Denies CP.    **Pt seen in the waiting room via teletriage by Rocky Mcgraw (PA), documentation completed by Asaf Montes. Pt to be sent to main ED for further evaluation - all orders placed to be followed by MD in the main ED**  Scribe Statement: IEva Cole (scribe), attest that this documentation has been prepared under the direction and in the presence of Rocky Mcgraw (PA) 67 M w/ PMHx of HTN c/o back pain x 2 weeks worsened today and an episode of syncope followed by SOB while gardening today. States while gardening felt a pull in his back when lifting, pain causes him to feel lightheaded and passed out, witnessed by family who states he was unconscious for ~5 seconds. Felt short of breath and nauseous after which resolved. No tearing/ripping pain. No chest pain, numbness/tingling, weakness, speech/visual changes     **Pt seen in the waiting room via teletriage by Rocky Mcgraw (PA), documentation completed by Abner -Asaf Velasquez. Pt to be sent to main ED for further evaluation - all orders placed to be followed by MD in the main ED**  Scribe Statement: I, Asaf Velasquez (scribe), attest that this documentation has been prepared under the direction and in the presence of Rocky Mcgraw (PA)    Rocky Mcgraw PA-C: The scribe's documentation has been prepared under my direction and personally reviewed by me in its entirety. I confirm that the note above accurately reflects history obtained.   Patient was rapidly assessed via telemedicine encounter; a limited history was obtained. The patient will be seen and further examined and worked up in the main ED and their care will be completed by the main ED team. Receiving team will follow up on labs, analgesia, any clinical imaging, and perform reassessment and disposition of the patient as clinically indicated. All decisions regarding the progression of care will be made at their discretion.

## 2022-06-15 NOTE — ED ADULT TRIAGE NOTE - CHIEF COMPLAINT QUOTE
back pain for a couple of days but was gardening and when trying to get up had syncopal episode for approx 6 seconds.

## 2022-06-15 NOTE — ED PROVIDER NOTE - ATTENDING CONTRIBUTION TO CARE
I, Marino Andrade, performed a history and physical exam of the patient and discussed their management with the resident and/or advanced care provider. I reviewed the resident and/or advanced care provider's note and agree with the documented findings and plan of care. I was present and available for all procedures.    68yo M w/ history of HTN coming in w/ back pain and an episode of syncope while gardening. Patient has had intermittent left sided back pain for ~1 month. 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 Patient was gardening and felt extreme pain from the left side of his pain, went to sit and felt very hot and felt lightheaded. Witnessed by daughter who states he was unconscious for about 5 seconds but when he awoke, was feeling mildly nauseous but alert and oriented. Symptoms were transient and have since self resolved. States back pain is position and not associated w/ leg weakness, difficulty walking, or urinary incontinence.    Well appearing and in NAD, head normal appearing atraumatic, trachea midline, no respiratory distress, lungs cta bilaterally, rrr no murmurs, soft NT ND abdomen, no visible extremity deformities, Alert and oriented, non focal neuro exam, skin warm and dry, normal affect and mood no ctl spine ttp midline FROM all 4 ext normal ambulation xray trop monitoring and ivf bolus reassess for dc with Follow up     concerning for syncope with back pain likely dehydration from heat and ambient temp, otherwise no red flags for cauda normal exam unlikely cardiogenic dissection aaa pe will obtain screening labs ekg,

## 2022-06-16 VITALS
HEART RATE: 66 BPM | DIASTOLIC BLOOD PRESSURE: 91 MMHG | SYSTOLIC BLOOD PRESSURE: 141 MMHG | OXYGEN SATURATION: 97 % | RESPIRATION RATE: 20 BRPM | TEMPERATURE: 99 F

## 2022-06-16 PROBLEM — I10 ESSENTIAL (PRIMARY) HYPERTENSION: Chronic | Status: ACTIVE | Noted: 2018-02-02
